# Patient Record
Sex: FEMALE | Race: BLACK OR AFRICAN AMERICAN | Employment: UNEMPLOYED | URBAN - METROPOLITAN AREA
[De-identification: names, ages, dates, MRNs, and addresses within clinical notes are randomized per-mention and may not be internally consistent; named-entity substitution may affect disease eponyms.]

---

## 2024-10-16 ENCOUNTER — OFFICE VISIT (OUTPATIENT)
Age: 59
End: 2024-10-16
Payer: MEDICAID

## 2024-10-16 VITALS
TEMPERATURE: 96.3 F | SYSTOLIC BLOOD PRESSURE: 116 MMHG | RESPIRATION RATE: 16 BRPM | HEIGHT: 63 IN | WEIGHT: 189 LBS | OXYGEN SATURATION: 98 % | DIASTOLIC BLOOD PRESSURE: 76 MMHG | BODY MASS INDEX: 33.49 KG/M2 | HEART RATE: 76 BPM

## 2024-10-16 DIAGNOSIS — R01.1 SYSTOLIC MURMUR: ICD-10-CM

## 2024-10-16 DIAGNOSIS — R07.9 CHEST PAIN, UNSPECIFIED TYPE: Primary | ICD-10-CM

## 2024-10-16 DIAGNOSIS — I51.89 DIASTOLIC DYSFUNCTION: ICD-10-CM

## 2024-10-16 DIAGNOSIS — I20.9 ANGINA PECTORIS (HCC): ICD-10-CM

## 2024-10-16 DIAGNOSIS — G47.33 OBSTRUCTIVE SLEEP APNEA: ICD-10-CM

## 2024-10-16 DIAGNOSIS — J41.8 MIXED SIMPLE AND MUCOPURULENT CHRONIC BRONCHITIS (HCC): ICD-10-CM

## 2024-10-16 DIAGNOSIS — R00.2 PALPITATIONS: ICD-10-CM

## 2024-10-16 DIAGNOSIS — R94.31 ABNORMAL ECG: ICD-10-CM

## 2024-10-16 DIAGNOSIS — R06.02 SOB (SHORTNESS OF BREATH): ICD-10-CM

## 2024-10-16 PROCEDURE — 99205 OFFICE O/P NEW HI 60 MIN: CPT | Performed by: INTERNAL MEDICINE

## 2024-10-16 PROCEDURE — 93000 ELECTROCARDIOGRAM COMPLETE: CPT | Performed by: INTERNAL MEDICINE

## 2024-10-16 RX ORDER — MULTIVITAMIN
TABLET ORAL
COMMUNITY

## 2024-10-16 RX ORDER — PREDNISONE 5 MG/1
5 TABLET ORAL EVERY MORNING
COMMUNITY
Start: 2024-10-04

## 2024-10-16 RX ORDER — ALBUTEROL SULFATE 90 UG/1
2 INHALANT RESPIRATORY (INHALATION) EVERY 6 HOURS PRN
COMMUNITY

## 2024-10-16 RX ORDER — HYDROCHLOROTHIAZIDE 25 MG/1
25 TABLET ORAL DAILY
COMMUNITY

## 2024-10-16 RX ORDER — NITROGLYCERIN 0.4 MG/1
0.4 TABLET SUBLINGUAL EVERY 5 MIN PRN
COMMUNITY

## 2024-10-16 RX ORDER — IBUPROFEN 800 MG/1
800 TABLET, FILM COATED ORAL EVERY 6 HOURS PRN
COMMUNITY

## 2024-10-16 ASSESSMENT — PATIENT HEALTH QUESTIONNAIRE - PHQ9
SUM OF ALL RESPONSES TO PHQ QUESTIONS 1-9: 0
SUM OF ALL RESPONSES TO PHQ QUESTIONS 1-9: 0
2. FEELING DOWN, DEPRESSED OR HOPELESS: NOT AT ALL
SUM OF ALL RESPONSES TO PHQ QUESTIONS 1-9: 0
1. LITTLE INTEREST OR PLEASURE IN DOING THINGS: NOT AT ALL
SUM OF ALL RESPONSES TO PHQ9 QUESTIONS 1 & 2: 0
SUM OF ALL RESPONSES TO PHQ QUESTIONS 1-9: 0

## 2024-10-16 NOTE — PROGRESS NOTES
1. \"Have you been to the ER, urgent care clinic since your last visit?  Hospitalized since your last visit?\" Reviewed by Dr. Ellis Modi    2. \"Have you seen or consulted any other health care providers outside of the [facility] since your last visit?\" Reviewed by Dr. Ellis Modi      Patient started to fill better after taking her first nitroglycerin tablet.  
tablet by mouth daily      nitroGLYCERIN (NITROSTAT) 0.4 MG SL tablet Place 1 tablet under the tongue every 5 minutes as needed for Chest pain up to max of 3 total doses. If no relief after 1 dose, call 911.      albuterol sulfate HFA (VENTOLIN HFA) 108 (90 Base) MCG/ACT inhaler Inhale 2 puffs into the lungs every 6 hours as needed for Wheezing      ibuprofen (ADVIL;MOTRIN) 800 MG tablet Take 1 tablet by mouth every 6 hours as needed for Pain       No current facility-administered medications for this visit.        Social History     Tobacco Use    Smoking status: Never    Smokeless tobacco: Never   Vaping Use    Vaping status: Never Used   Substance Use Topics    Alcohol use: Never    Drug use: Never        History reviewed. No pertinent family history.     Review of Systems    Physical Exam  Patient is well-developed and in no acute distress.  Eyes have equal, round pupils that are reactive to light and accommodation. Extraocular movements are full. Sclerae are clear. Throat is clear.  Neck is supple. No jugular venous distention noted. No carotid bruits.  Lungs are clear.  Heart exhibits normal S1 and S2. Regular rate and rhythm. A 1/6 murmur is present at the apex, no gallops.  Abdomen is soft, nondistended and nontender, with active bowel sounds.  No cyanosis, clubbing, or edema in extremities. Pulses are 1+ on both sides in the lower extremities. Upper extremities are 2+ bilaterally.  Skin is otherwise clear.      ASSESSMENT/PLAN:   Assessment & Plan  1. Chest pain.  The patient has been experiencing chest pain since March 2024, which has worsened over time. Given her history of hypertension, it is crucial to ensure the stability of her cardiac condition. An echocardiogram will be ordered, and a stress test will be discussed to rule out active coronary disease.    2. Shortness of breath.  She reports shortness of breath that has progressively worsened, now unable to walk more than a block without stopping. An

## 2025-01-09 ENCOUNTER — TELEPHONE (OUTPATIENT)
Age: 60
End: 2025-01-09

## 2025-01-09 NOTE — TELEPHONE ENCOUNTER
Patient called stating that she never got her results from her test in October of 2024. I explained that were probably posted to the portal.  She stated she could not get in the portal so I gave her the [employer] help desk number.    I reviewed her last ov note, ST and Echo.   I will talk with Dr. Modi, and told her I would call her back this week.

## 2025-01-15 NOTE — TELEPHONE ENCOUNTER
Did talk with  on Friday and reviewed symptoms and results of testing. He wants to review chart prior to ordering meds.    Called and identified the patient by full name and .     He wants me to call the patient and let her know that due to her Echo results it could be the cause of her SOB. Dr. Modi will probably start her on a trial of Jardiance or Farxiga to see if she gets improvement in her symptomsl.    I told the patient that this was just an update and we will be in touch when Dr. Modi confirms treatment.     She appreciated the call and update. She verbalized understanding.

## 2025-01-27 NOTE — TELEPHONE ENCOUNTER
Dr. Modi gave new orders:  -Call pt and see if she has ever had a breathing test. (PFT)  -OR a Sleep Study.    If yes, obtain results.  When patient calls back, give Dr. Modi an update.

## 2025-04-30 ENCOUNTER — OFFICE VISIT (OUTPATIENT)
Age: 60
End: 2025-04-30
Payer: MEDICAID

## 2025-04-30 VITALS
BODY MASS INDEX: 34.91 KG/M2 | TEMPERATURE: 97 F | WEIGHT: 197 LBS | HEART RATE: 84 BPM | HEIGHT: 63 IN | RESPIRATION RATE: 16 BRPM | OXYGEN SATURATION: 97 % | SYSTOLIC BLOOD PRESSURE: 146 MMHG | DIASTOLIC BLOOD PRESSURE: 84 MMHG

## 2025-04-30 DIAGNOSIS — R01.1 SYSTOLIC MURMUR: ICD-10-CM

## 2025-04-30 DIAGNOSIS — R94.31 ABNORMAL ECG: ICD-10-CM

## 2025-04-30 DIAGNOSIS — I51.89 DIASTOLIC DYSFUNCTION: ICD-10-CM

## 2025-04-30 DIAGNOSIS — R00.2 PALPITATIONS: Primary | ICD-10-CM

## 2025-04-30 DIAGNOSIS — G47.33 OBSTRUCTIVE SLEEP APNEA: ICD-10-CM

## 2025-04-30 DIAGNOSIS — R06.02 EXERTIONAL SHORTNESS OF BREATH: ICD-10-CM

## 2025-04-30 DIAGNOSIS — R07.89 OTHER CHEST PAIN: ICD-10-CM

## 2025-04-30 DIAGNOSIS — J41.8 MIXED SIMPLE AND MUCOPURULENT CHRONIC BRONCHITIS (HCC): ICD-10-CM

## 2025-04-30 PROCEDURE — 99215 OFFICE O/P EST HI 40 MIN: CPT | Performed by: INTERNAL MEDICINE

## 2025-04-30 RX ORDER — POTASSIUM CHLORIDE 750 MG/1
10 CAPSULE, EXTENDED RELEASE ORAL DAILY
COMMUNITY
Start: 2025-03-28

## 2025-04-30 ASSESSMENT — ENCOUNTER SYMPTOMS
ALLERGIC/IMMUNOLOGIC NEGATIVE: 1
EYES NEGATIVE: 1
GASTROINTESTINAL NEGATIVE: 1
SHORTNESS OF BREATH: 0

## 2025-04-30 NOTE — PROGRESS NOTES
Marquita Xiao (:  1965) is a 60 y.o. female,Established patient, here for evaluation of the following chief complaint(s):  Follow-up      Subjective   SUBJECTIVE/OBJECTIVE:  History of Present Illness  The patient presents for evaluation of irregular heartbeat, hypertension, and lower back arthritis.  Previously, she has been experiencing chest discomfort, which is not sharp but causes shortness of breath. The pain is located in the chest and abdomen area. At times, the discomfort is so severe that she needs to pause her activities. She also reports irregular heartbeats since 2024.  She has a history of COPD and sleep apnea.    Episodes of irregular heartbeat are reported approximately 10 times per month, characterized by a sensation of the heart racing and necessitating sitting down. Blood pressure readings at home have been consistently elevated, with systolic values around 129 and diastolic values occasionally reaching 85. Amlodipine is taken for blood pressure management.    Physical activity has improved, with the ability to walk up to five blocks twice a week, compared to a previous limitation of walking only from the house to the corner. Diuretics are taken intermittently, causing significant dryness when used.    She had emergency surgery on 2025 for a full boil, resulting in a little bit of soreness and burning in the abdomen, although healing was confirmed. Severe lower back pain due to arthritis was experienced this morning, and patches were applied.    SOCIAL HISTORY  Exercise: Walks about five blocks twice a week    I have carefully reviewed all available medical records, previous office notes, lab, x-ray and procedure reports    No past medical history on file.     No past surgical history on file.     Allergies   Allergen Reactions    Latex Itching    Sulfamethoxazole-Trimethoprim Nausea And Vomiting        Current Outpatient Medications   Medication Sig Dispense Refill

## 2025-05-08 ENCOUNTER — TELEPHONE (OUTPATIENT)
Age: 60
End: 2025-05-08

## 2025-05-08 NOTE — TELEPHONE ENCOUNTER
Incoming call from Marquita Xiao, two pt identifiers verified, name and  for Marquita Xiao.  Patient spoke with Marya about a monitor was placed on her and having issuse with the device. Call was transferred to me. I confirmed patient's name and . She stated when the device was placed on her she was told to cut it off and turn it back on and when she contacted office about having high blood pressure readings , she was informed to cut the device off and back on until her blood pressure went down. I walked patient thru instructions again and advised her to NOT cut the phone off at all. The phone should always remain on and if she are having symptoms she can hit the report symptoms button on the phone and record what  symptoms occurring. Patient verbally understood directions. She will come into office to have a New sensor placed on her to re start her 7 Days monitor was order for. Marquita Xiao voiced understanding.